# Patient Record
Sex: FEMALE | Race: WHITE | Employment: OTHER | URBAN - METROPOLITAN AREA
[De-identification: names, ages, dates, MRNs, and addresses within clinical notes are randomized per-mention and may not be internally consistent; named-entity substitution may affect disease eponyms.]

---

## 2017-05-26 ENCOUNTER — THERAPY VISIT (OUTPATIENT)
Dept: PHYSICAL THERAPY | Facility: CLINIC | Age: 82
End: 2017-05-26
Payer: MEDICARE

## 2017-05-26 DIAGNOSIS — M25.562 LEFT KNEE PAIN, UNSPECIFIED CHRONICITY: Primary | ICD-10-CM

## 2017-05-26 PROCEDURE — G8979 MOBILITY GOAL STATUS: HCPCS | Mod: GP | Performed by: PHYSICAL THERAPIST

## 2017-05-26 PROCEDURE — 97161 PT EVAL LOW COMPLEX 20 MIN: CPT | Mod: GP | Performed by: PHYSICAL THERAPIST

## 2017-05-26 PROCEDURE — G8978 MOBILITY CURRENT STATUS: HCPCS | Mod: GP | Performed by: PHYSICAL THERAPIST

## 2017-05-26 PROCEDURE — 97110 THERAPEUTIC EXERCISES: CPT | Mod: GP | Performed by: PHYSICAL THERAPIST

## 2017-05-26 ASSESSMENT — ACTIVITIES OF DAILY LIVING (ADL)
KNEE_ACTIVITY_OF_DAILY_LIVING_SUM: 21
RISE FROM A CHAIR: ACTIVITY IS SOMEWHAT DIFFICULT
RAW_SCORE: 21
KNEEL ON THE FRONT OF YOUR KNEE: ACTIVITY IS VERY DIFFICULT
AS_A_RESULT_OF_YOUR_KNEE_INJURY,_HOW_WOULD_YOU_RATE_YOUR_CURRENT_LEVEL_OF_DAILY_ACTIVITY?: ABNORMAL
GIVING WAY, BUCKLING OR SHIFTING OF KNEE: THE SYMPTOM AFFECTS MY ACTIVITY SEVERELY
WALK: ACTIVITY IS VERY DIFFICULT
GO UP STAIRS: ACTIVITY IS FAIRLY DIFFICULT
WEAKNESS: THE SYMPTOM AFFECTS MY ACTIVITY SEVERELY
HOW_WOULD_YOU_RATE_THE_OVERALL_FUNCTION_OF_YOUR_KNEE_DURING_YOUR_USUAL_DAILY_ACTIVITIES?: SEVERELY ABNORMAL
KNEE_ACTIVITY_OF_DAILY_LIVING_SCORE: 30
HOW_WOULD_YOU_RATE_THE_CURRENT_FUNCTION_OF_YOUR_KNEE_DURING_YOUR_USUAL_DAILY_ACTIVITIES_ON_A_SCALE_FROM_0_TO_100_WITH_100_BEING_YOUR_LEVEL_OF_KNEE_FUNCTION_PRIOR_TO_YOUR_INJURY_AND_0_BEING_THE_INABILITY_TO_PERFORM_ANY_OF_YOUR_USUAL_DAILY_ACTIVITIES?: 80
LIMPING: THE SYMPTOM AFFECTS MY ACTIVITY SEVERELY
GO DOWN STAIRS: ACTIVITY IS VERY DIFFICULT
PAIN: THE SYMPTOM AFFECTS MY ACTIVITY SEVERELY
STAND: ACTIVITY IS SOMEWHAT DIFFICULT
SIT WITH YOUR KNEE BENT: ACTIVITY IS MINIMALLY DIFFICULT
STIFFNESS: THE SYMPTOM AFFECTS MY ACTIVITY SEVERELY
SQUAT: I AM UNABLE TO DO THE ACTIVITY
SWELLING: THE SYMPTOM AFFECTS MY ACTIVITY SEVERELY

## 2017-05-26 NOTE — LETTER
"DEPARTMENT OF HEALTH AND HUMAN SERVICES  CENTERS FOR MEDICARE & MEDICAID SERVICES    PLAN/UPDATED PLAN OF PROGRESS FOR OUTPATIENT REHABILITATION    PATIENTS NAME:  Aide Damon   : 2/10/1929  PROVIDER NUMBER:    [id number]  [id number]   PROVIDER NAME: [facility] PHYSICAL THERAP[facility]  MEDICAL RECORD NUMBER: [id number]   START OF CARE DATE:  SOC Date: 17   TYPE:  PT  PRIMARY/TREATMENT DIAGNOSIS: (Pertinent Medical Diagnosis)  Left knee pain, unspecified chronicity    VISITS FROM START OF CARE:  Rxs Used: 1     Subjective:    Patient is a 88 year old female presenting with rehab left knee hpi.   Aide Damon is a 88 year old female with a left knee condition.  Condition occurred with:  Degenerative joint disease.  Condition occurred: for unknown reasons.  This is a recurrent condition  Patient presents to PT today with c/o L knee pain.  Patient stated the L knee/leg pain has come and gone over the years but this L knee pain started ~ 1 month ago for no reason.  Patient was questioning if she injured the L knee when she twisted standing up and felt a \"crack\" in the L knee.  Patient has followed up with the MD and was referred to PT on 17.    Patient reports pain:  Medial, lateral and in the joint.    Pain is described as sharp and aching and is constant and reported as 2/10 and 8/10.  Associated symptoms:  Buckling/giving out, loss of motion/stiffness and loss of strength. Pain is worse during the night.  Symptoms are exacerbated by activity, walking, ascending stairs, descending stairs, transfers, bending/squatting and standing and relieved by NSAID's and rest.  Since onset symptoms are unchanged.        General health as reported by patient is fair and good.  Pertinent medical history includes:  Osteoporosis, high blood pressure, heart problems, implanted device and thyroid problems.  Medical allergies: yes (adhesive).  Other surgeries include:  Orthopedic " surgery, heart surgery and other (PaceMaker, R TKA, Back surgery, and gall blader surgery).  Current medications:  Thyroid medication, high blood pressure medication and other (tylenol).  Current occupation is Retired; Lives alone in a single level home; stairs to basement; laundary is in basement  3 steps to go outside the home  Barriers include:  Stairs and lives alone.  Red flags:  None as reported by the patient.               Objective:  Gait:    Gait Type:  Antalgic   Assistive Devices:  None  Deviations:  Knee:  Knee extension decr LGeneral Deviations:  Stance time decr  Knee Evaluation:  ROM:    AROM  Extension:  Left: 0    Right:  0  Flexion: Left: 115    Right: 120  Strength:   Extension:  Left: 4-/5    Pain:-      Right: 5/5   Pain:  Flexion:  Left: 4/5   Pain:      Right: 4+/5   Pain:    Quad Set Left: Fair    Pain:   Quad Set Right: Good    Pain:  Ligament Testing:  Normal  Palpation:    Left knee tenderness present at:  Medial Joint Line; Lateral Joint Line; Patellar Tendon and Popliteal          Assessment/Plan:      Patient is a 88 year old female with left side knee complaints.    Patient has the following significant findings with corresponding treatment plan.                Diagnosis 1:  L knee pain  Pain -  hot/cold therapy and manual therapy  Decreased strength - therapeutic exercise and therapeutic activities  Impaired gait - gait training  Impaired muscle performance - neuro re-education  Decreased function - therapeutic activities  Therapy Evaluation Codes:   1) History comprised of:   Personal factors that impact the plan of care:      Past/current experiences.    Comorbidity factors that impact the plan of care are:      Heart problems, High blood pressure, Implanted device and osteoporosis.     Medications impacting care: Anti-inflammatory.  2) Examination of Body Systems comprised of:   Body structures and functions that impact the plan of care:      Knee.   Activity limitations that  "impact the plan of care are:      Bending, Lifting, Squatting/kneeling, Stairs, Standing, Walking and sit to stand.  3) Clinical presentation characteristics are:   Stable/Uncomplicated.  4) Decision-Making    Low complexity using standardized patient assessment instrument and/or measureable assessment of functional outcome.  Cumulative Therapy Evaluation is: Low complexity.    Previous and current functional limitations:  (See Goal Flow Sheet for this information)    Short term and Long term goals: (See Goal Flow Sheet for this information)   Communication ability:  Patient appears to be able to clearly communicate and understand verbal and written communication and follow directions correctly.  Treatment Explanation - The following has been discussed with the patient:   RX ordered/plan of care  Anticipated outcomes  Possible risks and side effects  This patient would benefit from PT intervention to resume normal activities.   Rehab potential is good.  Frequency:  2 X week, once daily  Duration:  for 4 weeks  Discharge Plan:  Achieve all LTG.  Independent in home treatment program.  Reach maximal therapeutic benefit.          Caregiver Signature/Credentials _____________________________ Date ________       Treating Provider: Nette Suazo, PT   I have reviewed and certified the need for these services and plan of treatment while under my care.        PHYSICIAN'S SIGNATURE:   _________________________________________  Date___________     Romain Ho    Certification period:  Beginning of Cert date period: 05/26/17 to  End of Cert period date: 08/23/17     Functional Level Progress Report: Please see attached \"Goal Flow sheet for Functional level.\"    ____X____ Continue Services or       ________ DC Services                Service dates: From  SOC Date: 05/26/17 date to present                         "

## 2017-05-26 NOTE — PROGRESS NOTES
"Subjective:    Patient is a 88 year old female presenting with rehab left knee hpi.   Aide Damon is a 88 year old female with a left knee condition.  Condition occurred with:  Degenerative joint disease.  Condition occurred: for unknown reasons.  This is a recurrent condition  Patient presents to PT today with c/o L knee pain.  Patient stated the L knee/leg pain has come and gone over the years but this L knee pain started ~ 1 month ago for no reason.  Patient was questioning if she injured the L knee when she twisted standing up and felt a \"crack\" in the L knee.  Patient has followed up with the MD and was referred to PT on 5/22/17.    Patient reports pain:  Medial, lateral and in the joint.    Pain is described as sharp and aching and is constant and reported as 2/10 and 8/10.  Associated symptoms:  Buckling/giving out, loss of motion/stiffness and loss of strength. Pain is worse during the night.  Symptoms are exacerbated by activity, walking, ascending stairs, descending stairs, transfers, bending/squatting and standing and relieved by NSAID's and rest.  Since onset symptoms are unchanged.        General health as reported by patient is fair and good.  Pertinent medical history includes:  Osteoporosis, high blood pressure, heart problems, implanted device and thyroid problems.  Medical allergies: yes (adhesive).  Other surgeries include:  Orthopedic surgery, heart surgery and other (PaceMaker, R TKA, Back surgery, and gall blader surgery).  Current medications:  Thyroid medication, high blood pressure medication and other (tylenol).  Current occupation is Retired; Lives alone in a single level home; stairs to basement; laundary is in basement  3 steps to go outside the home  .        Barriers include:  Stairs and lives alone.    Red flags:  None as reported by the patient.                        Objective:      Gait:    Gait Type:  Antalgic   Assistive Devices:  None  Deviations:  Knee:  Knee extension decr " LGeneral Deviations:  Stance time decr                                                      Knee Evaluation:  ROM:    AROM      Extension:  Left: 0    Right:  0  Flexion: Left: 115    Right: 120        Strength:     Extension:  Left: 4-/5    Pain:-      Right: 5/5   Pain:  Flexion:  Left: 4/5   Pain:      Right: 4+/5   Pain:    Quad Set Left: Fair    Pain:   Quad Set Right: Good    Pain:  Ligament Testing:  Normal                  Palpation:    Left knee tenderness present at:  Medial Joint Line; Lateral Joint Line; Patellar Tendon and Popliteal              General     ROS    Assessment/Plan:      Patient is a 88 year old female with left side knee complaints.    Patient has the following significant findings with corresponding treatment plan.                Diagnosis 1:  L knee pain  Pain -  hot/cold therapy and manual therapy  Decreased strength - therapeutic exercise and therapeutic activities  Impaired gait - gait training  Impaired muscle performance - neuro re-education  Decreased function - therapeutic activities    Therapy Evaluation Codes:   1) History comprised of:   Personal factors that impact the plan of care:      Past/current experiences.    Comorbidity factors that impact the plan of care are:      Heart problems, High blood pressure, Implanted device and osteoporosis.     Medications impacting care: Anti-inflammatory.  2) Examination of Body Systems comprised of:   Body structures and functions that impact the plan of care:      Knee.   Activity limitations that impact the plan of care are:      Bending, Lifting, Squatting/kneeling, Stairs, Standing, Walking and sit to stand.  3) Clinical presentation characteristics are:   Stable/Uncomplicated.  4) Decision-Making    Low complexity using standardized patient assessment instrument and/or measureable assessment of functional outcome.  Cumulative Therapy Evaluation is: Low complexity.    Previous and current functional limitations:  (See Goal Flow  Sheet for this information)    Short term and Long term goals: (See Goal Flow Sheet for this information)     Communication ability:  Patient appears to be able to clearly communicate and understand verbal and written communication and follow directions correctly.  Treatment Explanation - The following has been discussed with the patient:   RX ordered/plan of care  Anticipated outcomes  Possible risks and side effects  This patient would benefit from PT intervention to resume normal activities.   Rehab potential is good.    Frequency:  2 X week, once daily  Duration:  for 4 weeks  Discharge Plan:  Achieve all LTG.  Independent in home treatment program.  Reach maximal therapeutic benefit.    Please refer to the daily flowsheet for treatment today, total treatment time and time spent performing 1:1 timed codes.

## 2017-05-26 NOTE — MR AVS SNAPSHOT
After Visit Summary   5/26/2017    Aide Damon    MRN: [id number]           Patient Information     Date Of Birth          2/10/1929        Visit Information        Provider Department      5/26/2017 11:00 AM Nette Suazo, PT [facility]        Today's Diagnoses     Left knee pain, unspecified chronicity    -  1       Follow-ups after your visit        Your next 10 appointments already scheduled     Jun 02, 2017 10:20 AM CDT   MIKE Extremity with Nette Suazo, PT   [facility] ([facility]  )    [address]  [place] [address]   [phone]            Jun 05, 2017  9:30 AM CDT   MIKE Extremity with Nette Suazo PT   [facility] ([facility]  )    [address]  [place] [address]   [phone]            Jun 08, 2017  9:30 AM CDT   MIKE Extremity with Nette Suazo, PT   [facility] ([facility]  )    [address]  [place] [address]   [phone]              Who to contact     If you have questions or need follow up information about today's clinic visit or your schedule please contact [facility] directly at [phone].  Normal or non-critical lab and imaging results will be communicated to you by MyChart, letter or phone within 4 business days after the clinic has received the results. If you do not hear from us within 7 days, please contact the clinic through "[employer]hart or phone. If you have a critical or abnormal lab result, we will notify you by phone as soon as possible.  Submit refill requests through [employer] or call your pharmacy and they will forward the refill request to us. Please allow 3 business days for your refill to be  "completed.          Additional Information About Your Visit        [employer]LivelyFeed Information     [employer] lets you send messages to your doctor, view your test results, renew your prescriptions, schedule appointments and more. To sign up, go to [email or web address] . Click on \"Log in\" on the left side of the screen, which will take you to the Welcome page. Then click on \"Sign up Now\" on the right side of the page.     You will be asked to enter the access code listed below, as well as some personal information. Please follow the directions to create your username and password.     Your access code is: [email or web address]  Expires: 2017 12:50 PM     Your access code will  in 90 days. If you need help or a new code, please call your [place] clinic or [phone].        Care EveryWhere ID     This is your Care EveryWhere ID. This could be used by other organizations to access your [place] medical records  [id number]         Blood Pressure from Last 3 Encounters:   13 155/81   11 156/86    Weight from Last 3 Encounters:   13 64 kg (141 lb)   11 60.8 kg (134 lb)              We Performed the Following     HC PT EVAL, LOW COMPLEXITY     MIKE CERT REPORT     THERAPEUTIC EXERCISES        Primary Care Provider Office Phone # Fax #    Myranda Gonzalez [phone]       [facility] [address] [place] [address] [address] [place] [address]        Thank you!     Thank you for choosing [facility]ER[facility]  for your care. Our goal is always to provide you with excellent care. Hearing back from our patients is one way we can continue to improve our services. Please take a few minutes to complete the written survey that you may receive in the mail after your visit with us. Thank you!             Your Updated Medication List - Protect others around you: Learn how to safely use, store and throw away your medicines at www.disposemymeds.org. "          This list is accurate as of: 5/26/17 12:50 PM.  Always use your most recent med list.                   Brand Name Dispense Instructions for use    acetaminophen 500 MG tablet    TYLENOL     Take 1-2 tablets by mouth every 6 hours as needed.       alendronate 70 MG tablet    FOSAMAX     Take 70 mg by mouth. Take one tablet by mouth once a week. As directed       amLODIPine 10 MG tablet    NORVASC     Take 10 mg by mouth daily.       aspirin 81 MG tablet      Take 1 tablet by mouth daily. *       Calcium Carb-Cholecalciferol 600-1000 MG-UNIT Caps      Take  by mouth.       clobetasol 0.05 % ointment    TEMOVATE    60 g    Apply twice daily for up to 2 weeks at a time.       cloNIDine 0.1 mg/mL 0.1 mg/mL Soln    CATAPRES     Take 1.7 mcg/kg by mouth. Take 1 tablet by mouth twice daily       fexofenadine 180 MG tablet    ALLEGRA     Take 180 mg by mouth daily.       GLUCOSAMINE CHONDROITIN ADV PO      Take  by mouth. Take 2 caps by mouth daily       hydrochlorothiazide 25 MG tablet    HYDRODIURIL     Take 25 mg by mouth daily.       ibuprofen 200 MG capsule      Take 200 mg by mouth every 4 hours as needed.       levothyroxine 100 MCG tablet    SYNTHROID/LEVOTHROID     Take 100 mcg by mouth daily.       Melatonin 300 MCG Tabs      Take  by mouth. Take 1 tab by mouth at bedtime       metoprolol 100 MG tablet    LOPRESSOR     Take 100 mg by mouth 2 times daily.       omeprazole 20 MG CR capsule    priLOSEC     Take  by mouth daily.

## 2017-06-02 ENCOUNTER — THERAPY VISIT (OUTPATIENT)
Dept: PHYSICAL THERAPY | Facility: CLINIC | Age: 82
End: 2017-06-02
Payer: MEDICARE

## 2017-06-02 DIAGNOSIS — M25.562 LEFT KNEE PAIN, UNSPECIFIED CHRONICITY: ICD-10-CM

## 2017-06-02 PROCEDURE — 97112 NEUROMUSCULAR REEDUCATION: CPT | Mod: GP | Performed by: PHYSICAL THERAPIST

## 2017-06-02 PROCEDURE — 97110 THERAPEUTIC EXERCISES: CPT | Mod: GP | Performed by: PHYSICAL THERAPIST

## 2017-06-05 ENCOUNTER — THERAPY VISIT (OUTPATIENT)
Dept: PHYSICAL THERAPY | Facility: CLINIC | Age: 82
End: 2017-06-05
Payer: MEDICARE

## 2017-06-05 DIAGNOSIS — M25.562 LEFT KNEE PAIN, UNSPECIFIED CHRONICITY: ICD-10-CM

## 2017-06-05 PROCEDURE — 97110 THERAPEUTIC EXERCISES: CPT | Mod: GP | Performed by: PHYSICAL THERAPIST

## 2017-06-05 PROCEDURE — 97112 NEUROMUSCULAR REEDUCATION: CPT | Mod: GP | Performed by: PHYSICAL THERAPIST

## 2017-06-05 NOTE — MR AVS SNAPSHOT
"              After Visit Summary   6/5/2017    Aide Damon    MRN: [id number]           Patient Information     Date Of Birth          2/10/1929        Visit Information        Provider Department      6/5/2017 9:30 AM Nette Suazo, RACHAEL [facility]        Today's Diagnoses     Left knee pain, unspecified chronicity           Follow-ups after your visit        Your next 10 appointments already scheduled     Jun 08, 2017  9:30 AM CDT   MIKE Extremity with Nette Suazo PT   [facility] ([facility] [place]  )    [address]  [place] [place] [facility] [place] [address] [phone]              Who to contact     If you have questions or need follow up information about today's clinic visit or your schedule please contact [facility] directly at [phone].  Normal or non-critical lab and imaging results will be communicated to you by [employer], letter or phone within 4 business days after the clinic has received the results. If you do not hear from us within 7 days, please contact the clinic through [employer] or phone. If you have a critical or abnormal lab result, we will notify you by phone as soon as possible.  Submit refill requests through [employer] or call your pharmacy and they will forward the refill request to us. Please allow 3 business days for your refill to be completed.          Additional Information About Your Visit        [employer] Information     [employer] lets you send messages to your doctor, view your test results, renew your prescriptions, schedule appointments and more. To sign up, go to [email or web address] . Click on \"Log in\" on the left side of the screen, which will take you to the Welcome page. Then click on \"Sign up Now\" on the right side of the page.     You will be asked to enter the access code listed below, as well as some " personal information. Please follow the directions to create your username and password.     Your access code is: [email or web address]  Expires: 2017 12:50 PM     Your access code will  in 90 days. If you need help or a new code, please call your [place] clinic or [phone].        Care EveryWhere ID     This is your Care EveryWhere ID. This could be used by other organizations to access your [place] medical records  [id number]         Blood Pressure from Last 3 Encounters:   13 155/81   11 156/86    Weight from Last 3 Encounters:   13 64 kg (141 lb)   11 60.8 kg (134 lb)              We Performed the Following     NEUROMUSCULAR RE-EDUCATION     THERAPEUTIC EXERCISES        Primary Care Provider Office Phone # Fax #    Myranda Gonzalez [phone]       [address] [place] [address]        Thank you!     Thank you for [facility]  for your care. Our goal is always to provide you with excellent care. Hearing back from our patients is one way we can continue to improve our services. Please take a few minutes to complete the written survey that you may receive in the mail after your visit with us. Thank you!             Your Updated Medication List - Protect others around you: Learn how to safely use, store and throw away your medicines at www.disposemymeds.org.          This list is accurate as of: 17 10:30 AM.  Always use your most recent med list.                   Brand Name Dispense Instructions for use    acetaminophen 500 MG tablet    TYLENOL     Take 1-2 tablets by mouth every 6 hours as needed.       alendronate 70 MG tablet    FOSAMAX     Take 70 mg by mouth. Take one tablet by mouth once a week. As directed       amLODIPine 10 MG tablet    NORVASC     Take 10 mg by mouth daily.       aspirin 81 MG tablet      Take 1 tablet by mouth daily. *        Calcium Carb-Cholecalciferol 600-1000 MG-UNIT Caps      Take  by mouth.       clobetasol 0.05 % ointment    TEMOVATE    60 g    Apply twice daily for up to 2 weeks at a time.       cloNIDine 0.1 mg/mL 0.1 mg/mL Soln    CATAPRES     Take 1.7 mcg/kg by mouth. Take 1 tablet by mouth twice daily       fexofenadine 180 MG tablet    ALLEGRA     Take 180 mg by mouth daily.       GLUCOSAMINE CHONDROITIN ADV PO      Take  by mouth. Take 2 caps by mouth daily       hydrochlorothiazide 25 MG tablet    HYDRODIURIL     Take 25 mg by mouth daily.       ibuprofen 200 MG capsule      Take 200 mg by mouth every 4 hours as needed.       levothyroxine 100 MCG tablet    SYNTHROID/LEVOTHROID     Take 100 mcg by mouth daily.       Melatonin 300 MCG Tabs      Take  by mouth. Take 1 tab by mouth at bedtime       metoprolol 100 MG tablet    LOPRESSOR     Take 100 mg by mouth 2 times daily.       omeprazole 20 MG CR capsule    priLOSEC     Take  by mouth daily.

## 2017-06-05 NOTE — PROGRESS NOTES
Subjective:    HPI                    Objective:    System    Physical Exam    General     ROS    Assessment/Plan:      SUBJECTIVE  Subjective changes as noted by pt:  Pt feels the L knee is a little better; pain is still worse at night but pain is not as bad.   Current pain level:  5/10   Changes in function:  Yes, sit to stand is less painful     Adverse reaction to treatment or activity:  activity - increase pain    OBJECTIVE  Changes in objective findings:     L knee active flexion (supine): 110 degrees;  Observed:  L extension lag with LAQ.    ASSESSMENT  Aide continues to require intervention to meet STG and LTG's: PT  No change of symptoms has been noted.  Response to therapy has shown lack of progress in  pain level, ROM , strength and function  Progress made towards STG/LTG?  None    PLAN  Continue current treatment plan until patient demonstrates readiness to progress to higher level exercises.    PTA/ATC plan:  N/A    Please refer to the daily flowsheet for treatment today, total treatment time and time spent performing 1:1 timed codes.

## 2017-06-08 ENCOUNTER — THERAPY VISIT (OUTPATIENT)
Dept: PHYSICAL THERAPY | Facility: CLINIC | Age: 82
End: 2017-06-08
Payer: MEDICARE

## 2017-06-08 DIAGNOSIS — M25.562 LEFT KNEE PAIN, UNSPECIFIED CHRONICITY: ICD-10-CM

## 2017-06-08 PROCEDURE — 97112 NEUROMUSCULAR REEDUCATION: CPT | Mod: GP | Performed by: PHYSICAL THERAPIST

## 2017-06-08 PROCEDURE — 97110 THERAPEUTIC EXERCISES: CPT | Mod: GP | Performed by: PHYSICAL THERAPIST

## 2017-06-15 ENCOUNTER — THERAPY VISIT (OUTPATIENT)
Dept: PHYSICAL THERAPY | Facility: CLINIC | Age: 82
End: 2017-06-15
Payer: MEDICARE

## 2017-06-15 DIAGNOSIS — M25.562 LEFT KNEE PAIN, UNSPECIFIED CHRONICITY: ICD-10-CM

## 2017-06-15 PROCEDURE — G8980 MOBILITY D/C STATUS: HCPCS | Mod: GP | Performed by: PHYSICAL THERAPIST

## 2017-06-15 PROCEDURE — 97110 THERAPEUTIC EXERCISES: CPT | Mod: GP | Performed by: PHYSICAL THERAPIST

## 2017-06-15 PROCEDURE — 97112 NEUROMUSCULAR REEDUCATION: CPT | Mod: GP | Performed by: PHYSICAL THERAPIST

## 2017-06-15 PROCEDURE — G8978 MOBILITY CURRENT STATUS: HCPCS | Mod: GP | Performed by: PHYSICAL THERAPIST

## 2017-06-15 PROCEDURE — G8979 MOBILITY GOAL STATUS: HCPCS | Mod: GP | Performed by: PHYSICAL THERAPIST

## 2017-06-15 ASSESSMENT — ACTIVITIES OF DAILY LIVING (ADL)
PAIN: THE SYMPTOM AFFECTS MY ACTIVITY SLIGHTLY
WALK: ACTIVITY IS SOMEWHAT DIFFICULT
KNEE_ACTIVITY_OF_DAILY_LIVING_SUM: 36
RAW_SCORE: 36
SQUAT: I AM UNABLE TO DO THE ACTIVITY
AS_A_RESULT_OF_YOUR_KNEE_INJURY,_HOW_WOULD_YOU_RATE_YOUR_CURRENT_LEVEL_OF_DAILY_ACTIVITY?: ABNORMAL
STAND: ACTIVITY IS FAIRLY DIFFICULT
WEAKNESS: THE SYMPTOM AFFECTS MY ACTIVITY MODERATELY
SWELLING: I HAVE THE SYMPTOM BUT IT DOES NOT AFFECT MY ACTIVITY
GO UP STAIRS: ACTIVITY IS FAIRLY DIFFICULT
LIMPING: I HAVE THE SYMPTOM BUT IT DOES NOT AFFECT MY ACTIVITY
HOW_WOULD_YOU_RATE_THE_CURRENT_FUNCTION_OF_YOUR_KNEE_DURING_YOUR_USUAL_DAILY_ACTIVITIES_ON_A_SCALE_FROM_0_TO_100_WITH_100_BEING_YOUR_LEVEL_OF_KNEE_FUNCTION_PRIOR_TO_YOUR_INJURY_AND_0_BEING_THE_INABILITY_TO_PERFORM_ANY_OF_YOUR_USUAL_DAILY_ACTIVITIES?: 50
STIFFNESS: THE SYMPTOM AFFECTS MY ACTIVITY SLIGHTLY
KNEE_ACTIVITY_OF_DAILY_LIVING_SCORE: 51.43
SIT WITH YOUR KNEE BENT: ACTIVITY IS NOT DIFFICULT
HOW_WOULD_YOU_RATE_THE_OVERALL_FUNCTION_OF_YOUR_KNEE_DURING_YOUR_USUAL_DAILY_ACTIVITIES?: ABNORMAL
GO DOWN STAIRS: ACTIVITY IS FAIRLY DIFFICULT
RISE FROM A CHAIR: ACTIVITY IS SOMEWHAT DIFFICULT
GIVING WAY, BUCKLING OR SHIFTING OF KNEE: THE SYMPTOM AFFECTS MY ACTIVITY SLIGHTLY
KNEEL ON THE FRONT OF YOUR KNEE: I AM UNABLE TO DO THE ACTIVITY

## 2017-06-15 NOTE — PROGRESS NOTES
Subjective:    HPI       Knee Activity of Daily Living Score: 51.43            Objective:    System    Physical Exam    General     ROS    Assessment/Plan:      PROGRESS  REPORT    Progress reporting period is from 6/5/17 to 6/15/17.       SUBJECTIVE  Subjective changes noted by patient: Patient seen 5 out of 6 planned visits. Patient stated L knee pain comes and goes now vs being constant.  Currently has c/o pain, but not very bad, and stiffness since she has been moving around this morning.    Current pain level is:0-4/10.     Previous pain level was: 8/10.   Changes in function:  Yes (See Goal flowsheet attached for changes in current functional level)  Adverse reaction to treatment or activity: None    OBJECTIVE  Changes noted in objective findings:  Yes, Patient has been given an illustrated/written HEP and states she understands it.  Pt plans to continue with HEP.    Knee ROM:  L=124 flexion; R =125 flexion.  Strength:   Knee: flexion 4/5 Bilaterally; 4+/5 extension Bilaterally    Hip:  Flexion 4/5 Bilaterally  Gait: antalgic; lacks full L knee ext but able to correct when cued.  Proprioception: <5 sec Simone with SLS;  15 sec with feet together and eyes open             ASSESSMENT/PLAN  Updated problem list and treatment plan: Diagnosis 1:  L knee pain  Pain -  hot/cold therapy and home program  Impaired muscle performance - neuro re-education and home program  Decreased function - therapeutic activities and home program  STG/LTGs have been met or progress has been made towards goals:  Yes (See Goal flow sheet completed today.)  Assessment of Progress: Patient is meeting short term goals and is progressing towards long term goals.  Self Management Plans:  Patient is independent in a home treatment program.    Aide continues to require the following intervention to meet STG and LTG's:  PT intervention is no longer required to meet STG/LTG.    Recommendations:  This patient is ready to be discharged from therapy  and continue their home treatment program.    Please refer to the daily flowsheet for treatment today, total treatment time and time spent performing 1:1 timed codes.

## 2017-06-15 NOTE — LETTER
[facility]  [address]  [facility] [address]  [phone]    2017    Re: Aide Damon   :   2/10/1929  MRN:  [id number]   REFERRING PHYSICIAN:   Romain Ho    [facility] ATHLETIC [facility] PHYSICAL [facility]    Date of Initial Evaluation: 2017  Visits:  Rxs Used: 5  Reason for Referral:  Left knee pain, unspecified chronicity    EVALUATION SUMMARY         PROGRESS  REPORT  Progress reporting period is from 17 to 6/15/17.       SUBJECTIVE  Subjective changes noted by patient: Patient seen 5 out of 6 planned visits. Patient stated L knee pain comes and goes now vs being constant.  Currently has c/o pain, but not very bad, and stiffness since she has been moving around this morning.    Current pain level is:0-4/10.     Previous pain level was: 8/10.   Changes in function:  Yes (See Goal flowsheet attached for changes in current functional level)  Adverse reaction to treatment or activity: None  OBJECTIVE  Changes noted in objective findings:  Yes, Patient has been given an illustrated/written HEP and states she understands it.  Pt plans to continue with HEP.  Knee ROM:  L=124 flexion; R =125 flexion.  Strength:   Knee: flexion 4/5 Bilaterally; 4+/5 extension Bilaterally  Hip:  Flexion 4/5 Bilaterally  Gait: antalgic; lacks full L knee ext but able to correct when cued.  Proprioception: <5 sec Simone with SLS;  15 sec with feet together and eyes open  Knee Activity of Daily Living Score: 51.43     ASSESSMENT/PLAN  Updated problem list and treatment plan: Diagnosis 1:  L knee pain  Pain -  hot/cold therapy and home program  Impaired muscle performance - neuro re-education and home program  Decreased function - therapeutic activities and home program  STG/LTGs have been met or progress has been made towards goals:  Yes (See Goal flow sheet completed today.)    Assessment of Progress: Patient is meeting  short term goals and is progressing towards long term goals.  Self Management Plans:  Patient is independent in a home treatment program.  Aide continues to require the following intervention to meet STG and LTG's:  PT intervention is no longer required to meet STG/LTG.    Recommendations:  This patient is ready to be discharged from therapy and continue their home treatment program.                    Thank you for your referral.    INQUIRIES  Therapist: Nette Suazo, [facility]  [facility] PHYSICAL THERAPY  [address] [place] [address] [address]  [facility] [address]  Phone: [phone]  Fax: [phone]

## 2017-06-15 NOTE — MR AVS SNAPSHOT
"              After Visit Summary   6/15/2017    Aide Damon    MRN: [id number]           Patient Information     Date Of Birth          2/10/1929        Visit Information        Provider Department      6/15/2017 8:10 AM Nette Suazo PT [facility]        Today's Diagnoses     Left knee pain, unspecified chronicity           Follow-ups after your visit        Who to contact     If you have questions or need follow up information about today's clinic visit or your schedule please contact [facility] directly at [phone].  Normal or non-critical lab and imaging results will be communicated to you by [email or web address], letter or phone within 4 business days after the clinic has received the results. If you do not hear from us within 7 days, please contact the clinic through [email or web address] or phone. If you have a critical or abnormal lab result, we will notify you by phone as soon as possible.  Submit refill requests through FNZ or call your pharmacy and they will forward the refill request to us. Please allow 3 business days for your refill to be completed.          Additional Information About Your Visit        MyChart Information     FNZ lets you send messages to your doctor, view your test results, renew your prescriptions, schedule appointments and more. To sign up, go to [email or web address] . Click on \"Log in\" on the left side of the screen, which will take you to the Welcome page. Then click on \"Sign up Now\" on the right side of the page.     You will be asked to enter the access code listed below, as well as some personal information. Please follow the directions to create your username and password.     Your access code is: [email or web address]  Expires: 2017 12:50 PM     Your access code will  in 90 days. If you need help or a new code, please call your [place] clinic or [phone].      "   Care EveryWhere ID     This is your Care EveryWhere ID. This could be used by other organizations to access your [place] medical records  [id number]         Blood Pressure from Last 3 Encounters:   05/29/13 155/81   09/14/11 156/86    Weight from Last 3 Encounters:   05/29/13 64 kg (141 lb)   09/14/11 60.8 kg (134 lb)              We Performed the Following     MIKE PROGRESS NOTES REPORT     NEUROMUSCULAR RE-EDUCATION     THERAPEUTIC EXERCISES        Primary Care Provider Office Phone # Fax #    Myranda Gonzalez [phone]       [facility] PHYSICIANS [address] [place] [address]        Thank you!     Thank you for [facility] PHYSICAL THERAPY  for your care. Our goal is always to provide you with excellent care. Hearing back from our patients is one way we can continue to improve our services. Please take a few minutes to complete the written survey that you may receive in the mail after your visit with us. Thank you!             Your Updated Medication List - Protect others around you: Learn how to safely use, store and throw away your medicines at www.disposemymeds.org.          This list is accurate as of: 6/15/17  9:30 AM.  Always use your most recent med list.                   Brand Name Dispense Instructions for use    acetaminophen 500 MG tablet    TYLENOL     Take 1-2 tablets by mouth every 6 hours as needed.       alendronate 70 MG tablet    FOSAMAX     Take 70 mg by mouth. Take one tablet by mouth once a week. As directed       amLODIPine 10 MG tablet    NORVASC     Take 10 mg by mouth daily.       aspirin 81 MG tablet      Take 1 tablet by mouth daily. *       Calcium Carb-Cholecalciferol 600-1000 MG-UNIT Caps      Take  by mouth.       clobetasol 0.05 % ointment    TEMOVATE    60 g    Apply twice daily for up to 2 weeks at a time.       cloNIDine 0.1 mg/mL 0.1 mg/mL Soln    CATAPRES     Take 1.7 mcg/kg by mouth.  Take 1 tablet by mouth twice daily       fexofenadine 180 MG tablet    ALLEGRA     Take 180 mg by mouth daily.       GLUCOSAMINE CHONDROITIN ADV PO      Take  by mouth. Take 2 caps by mouth daily       hydrochlorothiazide 25 MG tablet    HYDRODIURIL     Take 25 mg by mouth daily.       ibuprofen 200 MG capsule      Take 200 mg by mouth every 4 hours as needed.       levothyroxine 100 MCG tablet    SYNTHROID/LEVOTHROID     Take 100 mcg by mouth daily.       Melatonin 300 MCG Tabs      Take  by mouth. Take 1 tab by mouth at bedtime       metoprolol 100 MG tablet    LOPRESSOR     Take 100 mg by mouth 2 times daily.       omeprazole 20 MG CR capsule    priLOSEC     Take  by mouth daily.

## 2018-06-05 ENCOUNTER — THERAPY VISIT (OUTPATIENT)
Dept: PHYSICAL THERAPY | Facility: CLINIC | Age: 83
End: 2018-06-05
Payer: MEDICARE

## 2018-06-05 DIAGNOSIS — M54.50 ACUTE BILATERAL LOW BACK PAIN WITHOUT SCIATICA: Primary | ICD-10-CM

## 2018-06-05 PROCEDURE — 97112 NEUROMUSCULAR REEDUCATION: CPT | Mod: GP | Performed by: PHYSICAL THERAPIST

## 2018-06-05 PROCEDURE — G8981 BODY POS CURRENT STATUS: HCPCS | Mod: GP | Performed by: PHYSICAL THERAPIST

## 2018-06-05 PROCEDURE — 97161 PT EVAL LOW COMPLEX 20 MIN: CPT | Mod: GP | Performed by: PHYSICAL THERAPIST

## 2018-06-05 PROCEDURE — G8982 BODY POS GOAL STATUS: HCPCS | Mod: GP | Performed by: PHYSICAL THERAPIST

## 2018-06-05 NOTE — LETTER
DEPARTMENT OF HEALTH AND HUMAN SERVICES  CENTERS FOR MEDICARE & MEDICAID SERVICES    PLAN/UPDATED PLAN OF PROGRESS FOR OUTPATIENT REHABILITATION    PATIENTS NAME:  Aide Damon   : 2/10/1929  PROVIDER NUMBER:    [id number]  [id number]   PROVIDER NAME: [facility]  MEDICAL RECORD NUMBER: [id number]   START OF CARE DATE:  SOC Date: 18   TYPE:  PT  PRIMARY/TREATMENT DIAGNOSIS: (Pertinent Medical Diagnosis)  Acute bilateral low back pain without sciatica  VISITS FROM START OF CARE:        [facility] Initial Evaluation  Subjective:  Patient is a 89 year old female presenting with rehab back hpi.   Aide Damon is a 89 year old female with a lumbar condition.  Condition occurred with:  Insidious onset.  Condition occurred: for unknown reasons.  This is a chronic condition  HISTORY OF HNP FOLLOWED BY SURGERY ~ .   MD: 2018..    Patient reports pain:  Central lumbar spine.  Radiates to:  Foot right and foot left.  Pain is described as aching and is constant and reported as 8/10.  Associated symptoms:  Loss of motion/stiffness and loss of strength. Pain is worse in the P.M..  Symptoms are exacerbated by bending, sitting and standing (TRANSFERS) and relieved by NSAID's, heat and rest.  Since onset symptoms are gradually worsening.  Special testing: NOT RECENT.  Previous treatment: NONE.    General health as reported by patient is good.  Pertinent medical history includes:  High blood pressure, osteoporosis and thyroid problems.  Medical allergies: no.  Other surgeries include:  Orthopedic surgery and other.  Current medications:  High blood pressure medication, sleep medication and thyroid medication.  Current occupation is RETIRED   Barriers include:  None as reported by the patient.  Red flags:  None as reported by the patient.              Objective:  System  Physical Exam  Travis Lumbar Evaluation  Posture:  Sitting: poor  Correction  of Posture: no effect    Movement Loss:  Flexion (Flex): mod  Side Glide R (SG R): pain and min  Side Glide L (SG L): nil  Test Movements:  FIS: During: increases    EIS: During: decreases and centralizing    Repeat EIS: After: no better and no worse      Conclusion: derangement  Principle of Treatment:  Posture Correction: IN SITTING WITH TOWEL ROLL.   Extension: EIS  Other: NEUTRAL SPINE, THER EX, THER ACT, NMR, MANUAL THERAPY  Assessment/Plan:    Patient is a 89 year old female with lumbar complaints.    Patient has the following significant findings with corresponding treatment plan.                Diagnosis 1:  ACUTE BILATERAL LOW BACK PAIN WITH SCIATICA  Pain -  hot/cold therapy, US, electric stimulation, manual therapy, splint/taping/bracing/orthotics, self management, education, directional preference exercise and home program  Decreased ROM/flexibility - manual therapy, therapeutic exercise, therapeutic activity and home program  Decreased function - therapeutic activities and home program  Impaired posture - neuro re-education, therapeutic activities and home program         PATIENTS NAME:  Aide Damon            : 2/10/1929    Therapy Evaluation Codes:   1) History comprised of:   Personal factors that impact the plan of care:      Age and Past/current experiences.    Comorbidity factors that impact the plan of care are:      None.     Medications impacting care: None.  2) Examination of Body Systems comprised of:   Body structures and functions that impact the plan of care:      Lumbar spine.   Activity limitations that impact the plan of care are:      Bathing, Bending, Dressing, Lifting and Tranfers..  3) Clinical presentation characteristics are:   Stable/Uncomplicated.  4) Decision-Making    Low complexity using standardized patient assessment instrument and/or measureable assessment of functional outcome.  Cumulative Therapy Evaluation is: Low complexity.    Previous and current functional  "limitations:  (See Goal Flow Sheet for this information)    Short term and Long term goals: (See Goal Flow Sheet for this information)     Communication ability:  Patient appears to be able to clearly communicate and understand verbal and written communication and follow directions correctly.  Treatment Explanation - The following has been discussed with the patient:   RX ordered/plan of care  Anticipated outcomes  Possible risks and side effects  This patient would benefit from PT intervention to resume normal activities.   Rehab potential is good.    Frequency:  1 X week, once daily  Duration:  for 6 weeks  Discharge Plan:  Achieve all LTG.  Independent in home treatment program.  Reach maximal therapeutic benefit.    Please refer to the daily flowsheet for treatment today, total treatment time and time spent performing 1:1 timed codes.     Caregiver Signature/Credentials _______________________________________________ Date ________       Huang Peña PT/ATC   I have reviewed and certified the need for these services and plan of treatment while under my care.    PHYSICIAN'S SIGNATURE:   ______________________________________________  Date___________     Romain Ho MD    Certification period:  Beginning of Cert date period: 06/05/18 to  End of Cert period date: 09/02/18     Functional Level Progress Report: Please see attached \"Goal Flow sheet for Functional level.\"    ____X____ Continue Services or       ________ DC Services                Service dates: From  SOC Date: 06/05/18 date to present                         "

## 2018-06-05 NOTE — MR AVS SNAPSHOT
"              After Visit Summary   6/5/2018    Aide Damon    MRN: [id number]           Patient Information     Date Of Birth          2/10/1929        Visit Information        Provider Department      6/5/2018 2:00 PM Ramez Peña, [facility]        Today's Diagnoses     Acute bilateral low back pain without sciatica    -  1       Follow-ups after your visit        Your next 10 appointments already scheduled     Missael 15, 2018 11:00 AM CDT   MIKE Spine with Ramez Peña PT   [facility]  )    [address]  [facility] [address]   [phone]            Jun 22, 2018 10:20 AM CDT   MIKE Spine with Ramez Peña, RACHAEL   [facility] ([facility]  )    [address]  [facility] [address]   [phone]              Who to contact     If you have questions or need follow up information about today's clinic visit or your schedule please contact [facility] directly at [phone].  Normal or non-critical lab and imaging results will be communicated to you by MyChart, letter or phone within 4 business days after the clinic has received the results. If you do not hear from us within 7 days, please contact the clinic through Think-Nowhart or phone. If you have a critical or abnormal lab result, we will notify you by phone as soon as possible.  Submit refill requests through [employer] or call your pharmacy and they will forward the refill request to us. Please allow 3 business days for your refill to be completed.          Additional Information About Your Visit        MyChart Information     [employer] lets you send messages to your doctor, view your test results, renew your prescriptions, schedule appointments and more. To sign up, go to [email or web address] . Click on \"Log in\" on the left side of the screen, which will take you to the " "Welcome page. Then click on \"Sign up Now\" on the right side of the page.     You will be asked to enter the access code listed below, as well as some personal information. Please follow the directions to create your username and password.     Your access code is: [email or web address]  Expires: 9/3/2018 11:25 PM     Your access code will  in 90 days. If you need help or a new code, please call your [place] clinic or [phone].        Care EveryWhere ID     This is your Care EveryWhere ID. This could be used by other organizations to access your [place] medical records  [id number]         Blood Pressure from Last 3 Encounters:   13 155/81   11 156/86    Weight from Last 3 Encounters:   13 64 kg (141 lb)   11 60.8 kg (134 lb)              We Performed the Following     MIKE CERT REPORT     MIKE Inital Eval Report     Neuromuscular Re-Education     PT Eval, Low Complexity ([id number])        Primary Care Provider Office Phone # Fax #    Myranda Gonzalez [phone]       XXX NO INFO FOUND  [address] [place] [address]        Equal Access to Services     KYLER GONZALEZ : Hadii aad ku hadasho Soomaali, waaxda luqadaha, qaybta kaalmada adeegyada, chayito mcallister . So [facility] [phone].    ATENCIÓN: Si habla español, tiene a landon disposición servicios gratuitos de asistencia lingüística. Llame al [phone].    We comply with applicable federal civil rights laws and [place] laws. We do not discriminate on the basis of race, color, national origin, age, disability, sex, sexual orientation, or gender identity.            Thank you!     Thank you for choosing [facility]  for your care. Our goal is always to provide you with excellent care. Hearing back from our patients is one way we can continue to improve our services. Please take a few minutes to complete the written survey that you may receive in the mail " after your visit with us. Thank you!             Your Updated Medication List - Protect others around you: Learn how to safely use, store and throw away your medicines at www.disposemymeds.org.          This list is accurate as of 6/5/18 11:25 PM.  Always use your most recent med list.                   Brand Name Dispense Instructions for use Diagnosis    acetaminophen 500 MG tablet    TYLENOL     Take 1-2 tablets by mouth every 6 hours as needed.        alendronate 70 MG tablet    FOSAMAX     Take 70 mg by mouth. Take one tablet by mouth once a week. As directed        amLODIPine 10 MG tablet    NORVASC     Take 10 mg by mouth daily.        aspirin 81 MG tablet      Take 1 tablet by mouth daily. *        Calcium Carb-Cholecalciferol 600-1000 MG-UNIT Caps      Take  by mouth.        clobetasol 0.05 % ointment    TEMOVATE    60 g    Apply twice daily for up to 2 weeks at a time.    CD (contact dermatitis)       cloNIDine 0.1 mg/mL 0.1 mg/mL Soln    CATAPRES     Take 1.7 mcg/kg by mouth. Take 1 tablet by mouth twice daily        fexofenadine 180 MG tablet    ALLEGRA     Take 180 mg by mouth daily.        GLUCOSAMINE CHONDROITIN ADV PO      Take  by mouth. Take 2 caps by mouth daily        hydrochlorothiazide 25 MG tablet    HYDRODIURIL     Take 25 mg by mouth daily.        ibuprofen 200 MG capsule      Take 200 mg by mouth every 4 hours as needed.        levothyroxine 100 MCG tablet    SYNTHROID/LEVOTHROID     Take 100 mcg by mouth daily.        Melatonin 300 MCG Tabs      Take  by mouth. Take 1 tab by mouth at bedtime        metoprolol tartrate 100 MG tablet    LOPRESSOR     Take 100 mg by mouth 2 times daily.        omeprazole 20 MG CR capsule    priLOSEC     Take  by mouth daily.

## 2018-06-15 ENCOUNTER — THERAPY VISIT (OUTPATIENT)
Dept: PHYSICAL THERAPY | Facility: CLINIC | Age: 83
End: 2018-06-15
Payer: MEDICARE

## 2018-06-15 DIAGNOSIS — M54.50 ACUTE BILATERAL LOW BACK PAIN WITHOUT SCIATICA: ICD-10-CM

## 2018-06-15 PROCEDURE — 97110 THERAPEUTIC EXERCISES: CPT | Mod: GP | Performed by: PHYSICAL THERAPIST

## 2018-06-15 PROCEDURE — 97112 NEUROMUSCULAR REEDUCATION: CPT | Mod: GP | Performed by: PHYSICAL THERAPIST

## 2018-06-22 ENCOUNTER — THERAPY VISIT (OUTPATIENT)
Dept: PHYSICAL THERAPY | Facility: CLINIC | Age: 83
End: 2018-06-22
Payer: MEDICARE

## 2018-06-22 DIAGNOSIS — M54.50 ACUTE BILATERAL LOW BACK PAIN WITHOUT SCIATICA: ICD-10-CM

## 2018-06-22 PROCEDURE — 97112 NEUROMUSCULAR REEDUCATION: CPT | Mod: GP | Performed by: PHYSICAL THERAPIST

## 2018-06-22 PROCEDURE — 97530 THERAPEUTIC ACTIVITIES: CPT | Mod: GP | Performed by: PHYSICAL THERAPIST

## 2018-07-05 ENCOUNTER — THERAPY VISIT (OUTPATIENT)
Dept: PHYSICAL THERAPY | Facility: CLINIC | Age: 83
End: 2018-07-05
Payer: MEDICARE

## 2018-07-05 DIAGNOSIS — M54.50 ACUTE BILATERAL LOW BACK PAIN WITHOUT SCIATICA: ICD-10-CM

## 2018-07-05 PROCEDURE — 97140 MANUAL THERAPY 1/> REGIONS: CPT | Mod: GP | Performed by: PHYSICAL THERAPIST

## 2018-07-05 PROCEDURE — 97035 APP MDLTY 1+ULTRASOUND EA 15: CPT | Mod: GP | Performed by: PHYSICAL THERAPIST

## 2018-07-10 ENCOUNTER — THERAPY VISIT (OUTPATIENT)
Dept: PHYSICAL THERAPY | Facility: CLINIC | Age: 83
End: 2018-07-10
Payer: MEDICARE

## 2018-07-10 DIAGNOSIS — M54.50 ACUTE BILATERAL LOW BACK PAIN WITHOUT SCIATICA: ICD-10-CM

## 2018-07-10 PROCEDURE — G8982 BODY POS GOAL STATUS: HCPCS | Mod: GP | Performed by: PHYSICAL THERAPIST

## 2018-07-10 PROCEDURE — 97140 MANUAL THERAPY 1/> REGIONS: CPT | Mod: GP | Performed by: PHYSICAL THERAPIST

## 2018-07-10 PROCEDURE — G8981 BODY POS CURRENT STATUS: HCPCS | Mod: GP | Performed by: PHYSICAL THERAPIST

## 2018-07-10 PROCEDURE — 97035 APP MDLTY 1+ULTRASOUND EA 15: CPT | Mod: GP | Performed by: PHYSICAL THERAPIST

## 2018-07-10 PROCEDURE — 97530 THERAPEUTIC ACTIVITIES: CPT | Mod: GP | Performed by: PHYSICAL THERAPIST

## 2018-07-18 ENCOUNTER — THERAPY VISIT (OUTPATIENT)
Dept: PHYSICAL THERAPY | Facility: CLINIC | Age: 83
End: 2018-07-18
Payer: MEDICARE

## 2018-07-18 DIAGNOSIS — M54.50 ACUTE BILATERAL LOW BACK PAIN WITHOUT SCIATICA: ICD-10-CM

## 2018-07-18 PROCEDURE — 97140 MANUAL THERAPY 1/> REGIONS: CPT | Mod: GP | Performed by: PHYSICAL THERAPIST

## 2018-07-18 PROCEDURE — 97035 APP MDLTY 1+ULTRASOUND EA 15: CPT | Mod: GP | Performed by: PHYSICAL THERAPIST

## 2018-07-18 PROCEDURE — 97110 THERAPEUTIC EXERCISES: CPT | Mod: GP | Performed by: PHYSICAL THERAPIST

## 2018-07-18 NOTE — LETTER
[facility]  [address]  [facility] [address]  [phone]    2018    Re: Aide Damon   :   2/10/1929  MRN:  [id number]   REFERRING PHYSICIAN:   Romain Ho  [facility]  Date of Initial Evaluation:2018  Visits:  Rxs Used: 6  Reason for Referral:  Acute bilateral low back pain without sciatica    EVALUATION SUMMARY  PROGRESS  REPORT  Progress reporting period is from 5/10/18 to 18.     SUBJECTIVE  Subjective: pt feels the same as last week, pt reports improved LBP with walking 3-4 blocks    Current Pain level: 5/10   Initial Pain level: 8/10   Changes in function: Yes, see goal flow sheet for change in function   Adverse reactions: None;   ,     The subjective and objective information are from the last SOAP note on this patient.    OBJECTIVE  Objective: Improved pain post PT session today pt reports fatigue with 3-5 reps of hip SLR exercises in a standing position, pt able to bend forward more easily, able to transfer in and out of bed more easily without pain; pt would like to be DC from PT for now and continue back in fall      ASSESSMENT/PLAN  Updated problem list and treatment plan: Diagnosis 1:  Low back pain   STG/LTGs have been met or progress has been made towards goals:  Yes (See Goal flow sheet completed today.)  Assessment of Progress: The patient's condition is improving.  The patient's condition has potential to improve.  Self Management Plans:  Patient has been instructed in a home treatment program.  Patient is independent in a home treatment program.  I have re-evaluated this patient and find that the nature, scope, duration and intensity of the therapy is appropriate for the medical condition of the patient.  Aide continues to require the following intervention to meet STG and LTG's: PT  We will discharge this patient from PT.    Recommendations:  This patient is ready to be discharged  from therapy and continue their home treatment program.    Please refer to the daily flowsheet for treatment today, total treatment time and time spent performing 1:1 timed codes.  Thank you for your referral.    INQUIRIES  Therapist: Angela Anglin PT  [facility]  [address]  [facility] [address]  Phone: [phone]  Fax: [phone]

## 2018-07-18 NOTE — MR AVS SNAPSHOT
After Visit Summary   7/18/2018    Aide Damon    MRN: [id number]           Patient Information     Date Of Birth          2/10/1929        Visit Information        Provider Department      7/18/2018 10:30 AM Angela Anglin PT [facility]        Today's Diagnoses     Acute bilateral low back pain without sciatica           Follow-ups after your visit        Who to contact     If you have questions or need follow up information about today's clinic visit or your schedule please contact [facility] directly at [phone].  Normal or non-critical lab and imaging results will be communicated to you by MyChart, letter or phone within 4 business days after the clinic has received the results. If you do not hear from us within 7 days, please contact the clinic through MyChart or phone. If you have a critical or abnormal lab result, we will notify you by phone as soon as possible.  Submit refill requests through [employer] or call your pharmacy and they will forward the refill request to us. Please allow 3 business days for your refill to be completed.          Additional Information About Your Visit        Care EveryWhere ID     This is your Care EveryWhere ID. This could be used by other organizations to access your [facility] medical records  [id number]         Blood Pressure from Last 3 Encounters:   05/29/13 155/81   09/14/11 156/86    Weight from Last 3 Encounters:   05/29/13 64 kg (141 lb)   09/14/11 60.8 kg (134 lb)              We Performed the Following     MIKE PROGRESS NOTES REPORT     MANUAL THER TECH,1+REGIONS,EA 15 MIN     THERAPEUTIC EXERCISES     ULTRASOUND THERAPY        Primary Care Provider Office Phone # Fax #    Myranda Gonzalez [phone]       XXX NO INFO FOUND  [address] [place] [address]        Equal Access to Services     LUKE GONZALEZ : korey Gillis  gabriel tiannajenni quijanochayito can. So [facility] [phone].    ATENCIÓN: Si kiran rosado, tiene a landon disposición servicios gratuitos de asistencia lingüística. Sangita al [phone].    We comply with applicable federal civil rights laws and [place] laws. We do not discriminate on the basis of race, color, national origin, age, disability, sex, sexual orientation, or gender identity.            Thank you!     Thank you for choosing [facility]  for your care. Our goal is always to provide you with excellent care. Hearing back from our patients is one way we can continue to improve our services. Please take a few minutes to complete the written survey that you may receive in the mail after your visit with us. Thank you!             Your Updated Medication List - Protect others around you: Learn how to safely use, store and throw away your medicines at www.disposemymeds.org.          This list is accurate as of 7/18/18  3:32 PM.  Always use your most recent med list.                   Brand Name Dispense Instructions for use Diagnosis    acetaminophen 500 MG tablet    TYLENOL     Take 1-2 tablets by mouth every 6 hours as needed.        alendronate 70 MG tablet    FOSAMAX     Take 70 mg by mouth. Take one tablet by mouth once a week. As directed        amLODIPine 10 MG tablet    NORVASC     Take 10 mg by mouth daily.        aspirin 81 MG tablet      Take 1 tablet by mouth daily. *        Calcium Carb-Cholecalciferol 600-1000 MG-UNIT Caps      Take  by mouth.        clobetasol 0.05 % ointment    TEMOVATE    60 g    Apply twice daily for up to 2 weeks at a time.    CD (contact dermatitis)       cloNIDine 0.1 mg/mL 0.1 mg/mL Soln    CATAPRES     Take 1.7 mcg/kg by mouth. Take 1 tablet by mouth twice daily        fexofenadine 180 MG tablet    ALLEGRA     Take 180 mg by mouth daily.        GLUCOSAMINE CHONDROITIN ADV PO      Take  by mouth. Take  2 caps by mouth daily        hydrochlorothiazide 25 MG tablet    HYDRODIURIL     Take 25 mg by mouth daily.        ibuprofen 200 MG capsule      Take 200 mg by mouth every 4 hours as needed.        levothyroxine 100 MCG tablet    SYNTHROID/LEVOTHROID     Take 100 mcg by mouth daily.        Melatonin 300 MCG Tabs      Take  by mouth. Take 1 tab by mouth at bedtime        metoprolol tartrate 100 MG tablet    LOPRESSOR     Take 100 mg by mouth 2 times daily.        omeprazole 20 MG CR capsule    priLOSEC     Take  by mouth daily.

## 2021-10-18 ENCOUNTER — THERAPY VISIT (OUTPATIENT)
Dept: PHYSICAL THERAPY | Facility: CLINIC | Age: 86
End: 2021-10-18
Payer: MEDICARE

## 2021-10-18 DIAGNOSIS — G89.29 CHRONIC MIDLINE LOW BACK PAIN WITHOUT SCIATICA: ICD-10-CM

## 2021-10-18 DIAGNOSIS — M54.50 CHRONIC MIDLINE LOW BACK PAIN WITHOUT SCIATICA: ICD-10-CM

## 2021-10-18 PROCEDURE — 97110 THERAPEUTIC EXERCISES: CPT | Mod: GP | Performed by: PHYSICAL THERAPIST

## 2021-10-18 PROCEDURE — 97161 PT EVAL LOW COMPLEX 20 MIN: CPT | Mod: GP | Performed by: PHYSICAL THERAPIST

## 2021-10-18 NOTE — LETTER
DEPARTMENT OF HEALTH AND HUMAN SERVICES  CENTERS FOR MEDICARE & MEDICAID SERVICES    PLAN/UPDATED PLAN OF PROGRESS FOR OUTPATIENT REHABILITATION          PATIENTS NAME:  Aide Damon   : 2/10/1929  PROVIDER NUMBER:    [id number]  [id number]   PROVID[facility]A [facility]  MEDICAL RECORD NUMBER: [id number]     START OF CARE DATE:  SOC Date: 10/18/21   TYPE:  PT    PRIMARY/TREATMENT DIAGNOSIS: (Pertinent Medical Diagnosis)  Chronic midline low back pain without sciatica    VISITS FROM START OF CARE:  Rxs Used: 1     Physical Therapy Initial Evaluation  Subjective:  The history is provided by the patient. No  was used.   Patient Health History  Aide Damon being seen for back pain.   Problem began: 2021.   Problem occurred: chronic history of back pain   Pain is reported as 4/10 on pain scale.  General health as reported by patient is fair.  Pertinent medical history includes: high blood pressure, implanted device, thyroid problems and heart problems.   Red flags:  None as reported by patient.  Medical allergies: other and adhesives. Other medical allergies details: pt stated she has many allergies; does not have a current list with her.   Surgeries include:  Orthopedic surgery, heart surgery and other. Other surgery history details: PaceMaker, R TKA, Back Surgery, gall bladder, appendix, ear and eye surgery.    Current medications:  Sleep medication, thyroid medication and other. Other medications details: tylenol (PRN).    Current occupation is Retired; lives alone- takes care of all her own cares and household tasks.           Therapist Generated HPI Evaluation  Problem details: Patient presents to PT today with c/o ongoing low back pain.  Patient stated she has a history of ongoing back pain; recent episode started a few months ago which has limited her ability to do some activity (ie: vacuuming and changing bedding).    Type of  problem:  Lumbar.    This is a chronic condition.  Condition occurred with:  Insidious onset.  Where condition occurred: for unknown reasons.  Patient reports pain:  Central lumbar spine, lumbar spine left and lumbar spine right.  Pain is described as aching and other (something is pulling apart) and is constant.  Pain radiates to:  No radiation. Pain is the same all the time.  PATIENTS NAME:  Aide Damon   : 2/10/1929    Since onset symptoms are unchanged.  Associated symptoms:  Loss of motion/stiffness. Symptoms are exacerbated by bending, standing, sitting, lifting and carrying  and relieved by activity/movement, rest, NSAID's and heat.  Special tests included:  X-ray (nothing recently).  Previous treatment includes physical therapy and surgery. There was moderate improvement following previous treatment.  Barriers include:  Transportation.        Objective:  Standing Alignment:    Cervical/Thoracic:  Thoracic kyphosis increased  Shoulder/UE:  Rounded shoulders  Lumbar:  Lordosis decr  Pelvic:  Normal       Lumbar/SI Evaluation  ROM:    AROM Lumbar:   Flexion:          Lower leg; pulling in her back  Ext:                    MOD loss; needs table for support   Side Bend:        Left:  Mid thigh    Right:  Mid thigh  Rotation:           Left:     Right:   Side Glide:        Left:     Right:   Lumbar Myotomes:    T12-L3 (Hip Flex):  Left: 4+    Right: 4+  L2-4 (Quads):  Left:  5    Right:  5  L4 (Ankle DF):  Left:  5    Right:  5  Lumbar DTR's:  not assessed  Lumbar Dermtomes:  not assessed  Lumbar Palpation:    Tenderness present at Left:    Quadratus Lumborum and Erector Spinae  Tenderness present at Right: Quadratus Lumborum and Erector Spinae    Assessment/Plan:    Patient is a 89 year old female with lumbar complaints.    Patient has the following significant findings with corresponding treatment plan.                Diagnosis 1:  Chronic LBP  Pain -  hot/cold therapy and manual therapy  Decreased  ROM/flexibility - manual therapy and therapeutic exercise  Decreased strength - therapeutic exercise and therapeutic activities  Impaired muscle performance - neuro re-education  Decreased function - therapeutic activities  Impaired posture - neuro re-education    Therapy Evaluation Codes:   1) History comprised of:   Personal factors that impact the plan of care:      Past/current experiences and Time since onset of symptoms.    Comorbidity factors that impact the plan of care are:      Heart problems, High blood pressure, Implanted device and thyroid problems and Osteoporosis.    PATIENTS NAME:  Aide Damon   : 2/10/1929     Medications impacting care: High blood pressure, Pain, Sleep and thyroid.  2) Examination of Body Systems comprised of:   Body structures and functions that impact the plan of care:      Lumbar spine.   Activity limitations that impact the plan of care are:      Bending, Lifting, Standing and transfers, pushing a vacuum and making the bed.  3) Clinical presentation characteristics are:   Stable/Uncomplicated.  4) Decision-Making    Low complexity using standardized patient assessment instrument and/or measureable assessment of functional outcome.  Cumulative Therapy Evaluation is: Low complexity.    Previous and current functional limitations:  (See Goal Flow Sheet for this information)    Short term and Long term goals: (See Goal Flow Sheet for this information)   Communication ability:  Patient appears to be able to clearly communicate and understand verbal and written communication and follow directions correctly.  Treatment Explanation - The following has been discussed with the patient:   RX ordered/plan of care  Anticipated outcomes  Possible risks and side effects  This patient would benefit from PT intervention to resume normal activities.   Rehab potential is good.    Frequency:  1 X week, once daily  Duration:  for 6 weeks  Discharge Plan:  Achieve all LTG.  Independent in  "home treatment program.  Reach maximal therapeutic benefit.  Please refer to the daily flowsheet for treatment today, total treatment time and time spent performing 1:1 timed codes.     Caregiver Signature/Credentials _____________________________ Date ________       Treating Provider: MARIA LUZ Kirk   I have reviewed and certified the need for these services and plan of treatment while under my care.      PHYSICIAN'S SIGNATURE:   _________________________________________  Date___________   Elier Snow MD    Certification period:  Beginning of Cert date period: 10/18/21 to  End of Cert period date: 01/15/22     Functional Level Progress Report: Please see attached \"Goal Flow sheet for Functional level.\"    ____X____ Continue Services or       ________ DC Services                Service dates: From  SOC Date: 10/18/21 date to present                         "

## 2021-10-18 NOTE — PROGRESS NOTES
Physical Therapy Initial Evaluation  Subjective:  The history is provided by the patient. No  was used.   Patient Health History  Aide Damon being seen for back pain.     Problem began: 9/28/2021.   Problem occurred: chronic history of back pain   Pain is reported as 4/10 on pain scale.  General health as reported by patient is fair.  Pertinent medical history includes: high blood pressure, implanted device, thyroid problems and heart problems.   Red flags:  None as reported by patient.  Medical allergies: other and adhesives. Other medical allergies details: pt stated she has many allergies; does not have a current list with her.   Surgeries include:  Orthopedic surgery, heart surgery and other. Other surgery history details: PaceMaker, R TKA, Back Surgery, gall bladder, appendix, ear and eye surgery.    Current medications:  Sleep medication, thyroid medication and other. Other medications details: tylenol (PRN).    Current occupation is Retired; lives alone- takes care of all her own cares and household tasks.                     Therapist Generated HPI Evaluation  Problem details: Patient presents to PT today with c/o ongoing low back pain.  Patient stated she has a history of ongoing back pain; recent episode started a few months ago which has limited her ability to do some activity (ie: vacuuming and changing bedding).  .         Type of problem:  Lumbar.    This is a chronic condition.  Condition occurred with:  Insidious onset.  Where condition occurred: for unknown reasons.  Patient reports pain:  Central lumbar spine, lumbar spine left and lumbar spine right.  Pain is described as aching and other (something is pulling apart) and is constant.  Pain radiates to:  No radiation. Pain is the same all the time.  Since onset symptoms are unchanged.  Associated symptoms:  Loss of motion/stiffness. Symptoms are exacerbated by bending, standing, sitting, lifting and carrying  and relieved  by activity/movement, rest, NSAID's and heat.  Special tests included:  X-ray (nothing recently).  Previous treatment includes physical therapy and surgery. There was moderate improvement following previous treatment.  Barriers include:  Transportation.                        Objective:  Standing Alignment:    Cervical/Thoracic:  Thoracic kyphosis increased  Shoulder/UE:  Rounded shoulders  Lumbar:  Lordosis decr  Pelvic:  Normal                         Lumbar/SI Evaluation  ROM:    AROM Lumbar:   Flexion:          Lower leg; pulling in her back  Ext:                    MOD loss; needs table for support   Side Bend:        Left:  Mid thigh    Right:  Mid thigh  Rotation:           Left:     Right:   Side Glide:        Left:     Right:           Lumbar Myotomes:    T12-L3 (Hip Flex):  Left: 4+    Right: 4+  L2-4 (Quads):  Left:  5    Right:  5  L4 (Ankle DF):  Left:  5    Right:  5      Lumbar DTR's:  not assessed        Lumbar Dermtomes:  not assessed                  Lumbar Palpation:    Tenderness present at Left:    Quadratus Lumborum and Erector Spinae  Tenderness present at Right: Quadratus Lumborum and Erector Spinae                                                     General     ROS    Assessment/Plan:    Patient is a 89 year old female with lumbar complaints.    Patient has the following significant findings with corresponding treatment plan.                Diagnosis 1:  Chronic LBP  Pain -  hot/cold therapy and manual therapy  Decreased ROM/flexibility - manual therapy and therapeutic exercise  Decreased strength - therapeutic exercise and therapeutic activities  Impaired muscle performance - neuro re-education  Decreased function - therapeutic activities  Impaired posture - neuro re-education    Therapy Evaluation Codes:   1) History comprised of:   Personal factors that impact the plan of care:      Past/current experiences and Time since onset of symptoms.    Comorbidity factors that impact the plan of  care are:      Heart problems, High blood pressure, Implanted device and thyroid problems and Osteoporosis.     Medications impacting care: High blood pressure, Pain, Sleep and thyroid.  2) Examination of Body Systems comprised of:   Body structures and functions that impact the plan of care:      Lumbar spine.   Activity limitations that impact the plan of care are:      Bending, Lifting, Standing and transfers, pushing a vacuum and making the bed.  3) Clinical presentation characteristics are:   Stable/Uncomplicated.  4) Decision-Making    Low complexity using standardized patient assessment instrument and/or measureable assessment of functional outcome.  Cumulative Therapy Evaluation is: Low complexity.    Previous and current functional limitations:  (See Goal Flow Sheet for this information)    Short term and Long term goals: (See Goal Flow Sheet for this information)     Communication ability:  Patient appears to be able to clearly communicate and understand verbal and written communication and follow directions correctly.  Treatment Explanation - The following has been discussed with the patient:   RX ordered/plan of care  Anticipated outcomes  Possible risks and side effects  This patient would benefit from PT intervention to resume normal activities.   Rehab potential is good.    Frequency:  1 X week, once daily  Duration:  for 6 weeks  Discharge Plan:  Achieve all LTG.  Independent in home treatment program.  Reach maximal therapeutic benefit.    Please refer to the daily flowsheet for treatment today, total treatment time and time spent performing 1:1 timed codes.

## 2021-11-01 ENCOUNTER — THERAPY VISIT (OUTPATIENT)
Dept: PHYSICAL THERAPY | Facility: CLINIC | Age: 86
End: 2021-11-01
Payer: MEDICARE

## 2021-11-01 DIAGNOSIS — M54.50 CHRONIC MIDLINE LOW BACK PAIN WITHOUT SCIATICA: ICD-10-CM

## 2021-11-01 DIAGNOSIS — G89.29 CHRONIC MIDLINE LOW BACK PAIN WITHOUT SCIATICA: ICD-10-CM

## 2021-11-01 PROCEDURE — 97110 THERAPEUTIC EXERCISES: CPT | Mod: GP | Performed by: PHYSICAL THERAPIST

## 2021-11-10 ENCOUNTER — THERAPY VISIT (OUTPATIENT)
Dept: PHYSICAL THERAPY | Facility: CLINIC | Age: 86
End: 2021-11-10
Payer: MEDICARE

## 2021-11-10 DIAGNOSIS — M54.50 CHRONIC MIDLINE LOW BACK PAIN WITHOUT SCIATICA: ICD-10-CM

## 2021-11-10 DIAGNOSIS — G89.29 CHRONIC MIDLINE LOW BACK PAIN WITHOUT SCIATICA: ICD-10-CM

## 2021-11-10 PROCEDURE — 97035 APP MDLTY 1+ULTRASOUND EA 15: CPT | Mod: GP | Performed by: PHYSICAL THERAPIST

## 2021-11-10 PROCEDURE — 97140 MANUAL THERAPY 1/> REGIONS: CPT | Mod: GP | Performed by: PHYSICAL THERAPIST

## 2021-11-17 ENCOUNTER — THERAPY VISIT (OUTPATIENT)
Dept: PHYSICAL THERAPY | Facility: CLINIC | Age: 86
End: 2021-11-17
Payer: MEDICARE

## 2021-11-17 DIAGNOSIS — M54.50 CHRONIC MIDLINE LOW BACK PAIN WITHOUT SCIATICA: ICD-10-CM

## 2021-11-17 DIAGNOSIS — G89.29 CHRONIC MIDLINE LOW BACK PAIN WITHOUT SCIATICA: ICD-10-CM

## 2021-11-17 PROCEDURE — 97110 THERAPEUTIC EXERCISES: CPT | Mod: GP | Performed by: PHYSICAL THERAPIST

## 2021-11-17 PROCEDURE — 97035 APP MDLTY 1+ULTRASOUND EA 15: CPT | Mod: GP | Performed by: PHYSICAL THERAPIST

## 2021-11-17 PROCEDURE — 97140 MANUAL THERAPY 1/> REGIONS: CPT | Mod: GP | Performed by: PHYSICAL THERAPIST

## 2021-12-02 ENCOUNTER — THERAPY VISIT (OUTPATIENT)
Dept: PHYSICAL THERAPY | Facility: CLINIC | Age: 86
End: 2021-12-02
Payer: MEDICARE

## 2021-12-02 DIAGNOSIS — M54.50 CHRONIC MIDLINE LOW BACK PAIN WITHOUT SCIATICA: ICD-10-CM

## 2021-12-02 DIAGNOSIS — G89.29 CHRONIC MIDLINE LOW BACK PAIN WITHOUT SCIATICA: ICD-10-CM

## 2021-12-02 PROCEDURE — 97035 APP MDLTY 1+ULTRASOUND EA 15: CPT | Mod: GP | Performed by: PHYSICAL THERAPIST

## 2021-12-02 PROCEDURE — 97140 MANUAL THERAPY 1/> REGIONS: CPT | Mod: GP | Performed by: PHYSICAL THERAPIST

## 2021-12-02 PROCEDURE — 97110 THERAPEUTIC EXERCISES: CPT | Mod: GP | Performed by: PHYSICAL THERAPIST

## 2021-12-02 NOTE — PROGRESS NOTES
Subjective:  HPI  Physical Exam                    Objective:  System    Physical Exam    General     ROS    Assessment/Plan:    DISCHARGE REPORT    Progress reporting period is from 10/18/2021 to 12/2/2201.       SUBJECTIVE  Subjective changes noted by patient:  Patient stated as long as she is not doing anything her back is fine; Also reported she is able to sit 1 hour and get up out of the chair with little to no pain.    Current pain level is : 1/10.     Previous pain level was   4/10.   Changes in function:  Less pain with activity.  Adverse reaction to treatment or activity: None    OBJECTIVE  Changes noted in objective findings:    TROM: Flexion= lower leg, Ext= mod loss.    LE strength is good.    Standing posture: fair; decrease in lumbar lordosis     ASSESSMENT/PLAN  Updated problem list and treatment plan: Diagnosis 1:  Back Pain  Pain -  hot/cold therapy, US and manual therapy  Decreased ROM/flexibility - manual therapy, therapeutic exercise and home program  Impaired muscle performance - neuro re-education and home program  Decreased function - therapeutic activities and home program  Impaired posture - neuro re-education and home program  STG/LTGs have been met or progress has been made towards goals:  Yes (See Goal flow sheet completed today.)  Assessment of Progress: The patient's condition is improving.  Self Management Plans:  Patient has been instructed in a home treatment program.  Patient is independent in a home treatment program.    Aide continues to require the following intervention to meet STG and LTG's:  PT intervention is no longer required to meet STG/LTG.    Recommendations:  Patient is choosing to trial HEP at this time and will call back if she needs additional PT.      Please refer to the daily flowsheet for treatment today, total treatment time and time spent performing 1:1 timed codes.

## 2021-12-02 NOTE — LETTER
[facility]  [address]  [facility] [address]  [phone]    2021    Re: Aide Damon   :   2/10/1929  MRN:  [id number]   REFERRING PHYSICIAN:   MD [facility]  Date of Initial Evaluation:  10/18/2021  Visits:  Rxs Used: 5  Reason for Referral:  Chronic midline low back pain without sciatica    DISCHARGE REPORT  Progress reporting period is from 10/18/2021 to 2201.       SUBJECTIVE  Subjective changes noted by patient:  Patient stated as long as she is not doing anything her back is fine; Also reported she is able to sit 1 hour and get up out of the chair with little to no pain.    Current pain level is : 1/10.     Previous pain level was   4/10.   Changes in function:  Less pain with activity.  Adverse reaction to treatment or activity: None    OBJECTIVE  Changes noted in objective findings:    TROM: Flexion= lower leg, Ext= mod loss.    LE strength is good.    Standing posture: fair; decrease in lumbar lordosis     ASSESSMENT/PLAN  Updated problem list and treatment plan: Diagnosis 1:  Back Pain  Pain -  hot/cold therapy, US and manual therapy  Decreased ROM/flexibility - manual therapy, therapeutic exercise and home program  Impaired muscle performance - neuro re-education and home program  Decreased function - therapeutic activities and home program  Impaired posture - neuro re-education and home program  STG/LTGs have been met or progress has been made towards goals:  Yes (See Goal flow sheet completed today.)  Assessment of Progress: The patient's condition is improving.  Self Management Plans:  Patient has been instructed in a home treatment program.  Patient is independent in a home treatment program.    Aide continues to require the following intervention to meet STG and LTG's:  PT intervention is no longer required to meet STG/LTG.          Re: Aide DUEÑAS  Marisol   :   2/10/1929    Recommendations:  Patient is choosing to trial HEP at this time and will call back if she needs additional PT.      Please refer to the daily flowsheet for treatment today, total treatment time and time spent performing 1:1 timed codes.      Thank you for your referral.    INQUIRIES  Therapist: Nette Suazo [facility]  [address]  [facility] [address]  Phone: [phone]  Fax: [phone]

## 2024-03-21 NOTE — PROGRESS NOTES
[facility] Initial Evaluation  Subjective:  Patient is a 89 year old female presenting with rehab back hpi.   Aide Damon is a 89 year old female with a lumbar condition.  Condition occurred with:  Insidious onset.  Condition occurred: for unknown reasons.  This is a chronic condition  HISTORY OF HNP FOLLOWED BY SURGERY ~ 1996.     MD: 5/31/2018..    Patient reports pain:  Central lumbar spine.  Radiates to:  Foot right and foot left.  Pain is described as aching and is constant and reported as 8/10.  Associated symptoms:  Loss of motion/stiffness and loss of strength. Pain is worse in the P.M..  Symptoms are exacerbated by bending, sitting and standing (TRANSFERS) and relieved by NSAID's, heat and rest.  Since onset symptoms are gradually worsening.  Special testing: NOT RECENT.  Previous treatment: NONE.    General health as reported by patient is good.  Pertinent medical history includes:  High blood pressure, osteoporosis and thyroid problems.  Medical allergies: no.  Other surgeries include:  Orthopedic surgery and other.  Current medications:  High blood pressure medication, sleep medication and thyroid medication.  Current occupation is RETIRED  .        Barriers include:  None as reported by the patient.    Red flags:  None as reported by the patient.                        Objective:  System    Physical Exam      [facility] Lumbar Evaluation    Posture:  Sitting: poor        Correction of Posture: no effect    Movement Loss:  Flexion (Flex): mod    Side Glide R (SG R): pain and min  Side Glide L (SG L): nil  Test Movements:  FIS: During: increases      EIS: During: decreases and centralizing    Repeat EIS: After: no better and no worse              Conclusion: derangement  Principle of Treatment:  Posture Correction: IN SITTING WITH TOWEL ROLL.     Extension: EIS    Other: NEUTRAL SPINE, THER EX, THER ACT, NMR, MANUAL THERAPY                                        ROS    Assessment/Plan:    Patient is a 89 year old female with lumbar complaints.    Patient has the following significant findings with corresponding treatment plan.                Diagnosis 1:  ACUTE BILATERAL LOW BACK PAIN WITH SCIATICA  Pain -  hot/cold therapy, US, electric stimulation, manual therapy, splint/taping/bracing/orthotics, self management, education, directional preference exercise and home program  Decreased ROM/flexibility - manual therapy, therapeutic exercise, therapeutic activity and home program  Decreased function - therapeutic activities and home program  Impaired posture - neuro re-education, therapeutic activities and home program    Therapy Evaluation Codes:   1) History comprised of:   Personal factors that impact the plan of care:      Age and Past/current experiences.    Comorbidity factors that impact the plan of care are:      None.     Medications impacting care: None.  2) Examination of Body Systems comprised of:   Body structures and functions that impact the plan of care:      Lumbar spine.   Activity limitations that impact the plan of care are:      Bathing, Bending, Dressing, Lifting and Tranfers..  3) Clinical presentation characteristics are:   Stable/Uncomplicated.  4) Decision-Making    Low complexity using standardized patient assessment instrument and/or measureable assessment of functional outcome.  Cumulative Therapy Evaluation is: Low complexity.    Previous and current functional limitations:  (See Goal Flow Sheet for this information)    Short term and Long term goals: (See Goal Flow Sheet for this information)     Communication ability:  Patient appears to be able to clearly communicate and understand verbal and written communication and follow directions correctly.  Treatment Explanation - The following has been discussed with the patient:   RX ordered/plan of care  Anticipated outcomes  Possible risks and side effects  This patient would benefit from PT  intervention to resume normal activities.   Rehab potential is good.    Frequency:  1 X week, once daily  Duration:  for 6 weeks  Discharge Plan:  Achieve all LTG.  Independent in home treatment program.  Reach maximal therapeutic benefit.    Please refer to the daily flowsheet for treatment today, total treatment time and time spent performing 1:1 timed codes.        Female